# Patient Record
Sex: MALE | Race: WHITE
[De-identification: names, ages, dates, MRNs, and addresses within clinical notes are randomized per-mention and may not be internally consistent; named-entity substitution may affect disease eponyms.]

---

## 2020-12-29 ENCOUNTER — HOSPITAL ENCOUNTER (OUTPATIENT)
Dept: HOSPITAL 74 - FASU | Age: 50
Discharge: HOME | End: 2020-12-29
Attending: ORTHOPAEDIC SURGERY
Payer: COMMERCIAL

## 2020-12-29 VITALS — HEART RATE: 82 BPM | DIASTOLIC BLOOD PRESSURE: 71 MMHG | SYSTOLIC BLOOD PRESSURE: 128 MMHG

## 2020-12-29 VITALS — TEMPERATURE: 97.5 F

## 2020-12-29 VITALS — BODY MASS INDEX: 36.6 KG/M2

## 2020-12-29 DIAGNOSIS — M19.011: ICD-10-CM

## 2020-12-29 DIAGNOSIS — M75.121: Primary | ICD-10-CM

## 2020-12-29 DIAGNOSIS — M75.01: ICD-10-CM

## 2020-12-29 DIAGNOSIS — M75.41: ICD-10-CM

## 2020-12-29 DIAGNOSIS — M24.111: ICD-10-CM

## 2020-12-29 PROCEDURE — 0RBJ4ZZ EXCISION OF RIGHT SHOULDER JOINT, PERCUTANEOUS ENDOSCOPIC APPROACH: ICD-10-PCS | Performed by: ORTHOPAEDIC SURGERY

## 2020-12-29 PROCEDURE — 0PB94ZZ EXCISION OF RIGHT CLAVICLE, PERCUTANEOUS ENDOSCOPIC APPROACH: ICD-10-PCS | Performed by: ORTHOPAEDIC SURGERY

## 2020-12-29 PROCEDURE — 0RNJ4ZZ RELEASE RIGHT SHOULDER JOINT, PERCUTANEOUS ENDOSCOPIC APPROACH: ICD-10-PCS | Performed by: ORTHOPAEDIC SURGERY

## 2020-12-29 PROCEDURE — U0003 INFECTIOUS AGENT DETECTION BY NUCLEIC ACID (DNA OR RNA); SEVERE ACUTE RESPIRATORY SYNDROME CORONAVIRUS 2 (SARS-COV-2) (CORONAVIRUS DISEASE [COVID-19]), AMPLIFIED PROBE TECHNIQUE, MAKING USE OF HIGH THROUGHPUT TECHNOLOGIES AS DESCRIBED BY CMS-2020-01-R: HCPCS

## 2020-12-29 PROCEDURE — C9803 HOPD COVID-19 SPEC COLLECT: HCPCS

## 2020-12-29 PROCEDURE — 0LQ14ZZ REPAIR RIGHT SHOULDER TENDON, PERCUTANEOUS ENDOSCOPIC APPROACH: ICD-10-PCS | Performed by: ORTHOPAEDIC SURGERY

## 2020-12-30 NOTE — PATH
Surgical Pathology Report



Patient Name:  NIYA GONG

Accession #:  [id number]

Med. Rec. #:  [id number]                                                        

   /Age/Gender:  1970 (Age: 50) / M

Account:  [id number]                                                          

             [facility] 

Taken:  2020

Received:  2020

Reported:  2020

Physicians:  Pardeep Villa M.D.

  



Specimen(s) Received

 RIGHT SHOULDER SHAVINGS 





Clinical History

Right rotator cuff tear







Final Diagnosis

RIGHT SHOULDER SHAVINGS:

PORTIONS OF FIBROCARTILAGINOUS TISSUE WITH FOCAL DEGENERATIVE CHANGE AND

CHONDROCALCINOSIS.

FRAGMENTS OF BONE AND SKELETAL MUSCLE WITH NO SIGNIFICANT PATHOLOGIC CHANGE.





***Electronically Signed***

Milton Wayne M.D.





Gross Description

Received in formalin labeled "right shoulder shavings," are multiple fragments

of yellow and white soft tissue measuring 3.0 x 3.0 x 0.3 cm aggregate. The

formalin is filtered and representative portions are submitted in one cassette.

GINNA/2020



felisha/2020

## 2020-12-30 NOTE — OP
DATE OF OPERATION:  12/30/2020

 

LOCATION:  [facility]

 

SURGEON:  Pardeep Lehman MD 

 

ASSISTANT:  IRIS Rueda 

 

PREOPERATIVE DIAGNOSES:  

1.  Right shoulder rotator cuff tear. 

2.  Right shoulder adhesive capsulitis. 

3.  Right shoulder impingement.  

4.  Right shoulder acromioclavicular degenerative joint disease. 

5.  Right shoulder superior labral tear, anterior and posterior, with synovitis. 

 

POSTOPERATIVE DIAGNOSES:  

1.  Right shoulder rotator cuff tear. 

2.  Right shoulder adhesive capsulitis. 

3.  Right shoulder impingement.  

4.  Right shoulder acromioclavicular degenerative joint disease. 

5.  Right shoulder superior labral tear, anterior and posterior, with synovitis. 

 

PROCEDURE PERFORMED:  

1.  Right shoulder arthroscopy with arthroscopic rotator cuff repair (CPT code

22251). 

2.  Right shoulder arthroscopy with lysis and resection of adhesions (CPT code

00643). 

3.  Right shoulder arthroscopy with subacromial decompression (CPT code 19229). 

4.  Right shoulder arthroscopy with resection of the distal clavicle and

acromioclavicular joint (CPT code 55619). 

5.  Right shoulder arthroscopy with debridement (CPT code 42444). 

 

FINDINGS:  

1.  Glenohumeral synovitis with adhesions anteriorly to the subscapularis. 

2.  Superior labral tear, anterior-posterior, type 1. 

3.  Partial biceps tear, 10%. 

4.  Full-thickness supraspinatus rotator cuff repair, 6 cm in length, full thickness,

central portion. 

5.  Type 2 acromion with anterolateral spurring. 

6.  Inferior spurs of the clavicle, acromioclavicular joint disease. 

7.  Thick scar to subacromial space.  

8.  Posterior labral fraying. 

 

REPAIR TYPE:  Three mattress sutures were placed in the supraspinatus.  The anterior

two mattress were secured to a Satinder rotator cuff anchor and the posterior was put

in a separate anchor.  The superior flap of the rotator cuff was then secured by

bringing the tie connecting the flap to the posterior anchor.  

 

DESCRIPTION PROCEDURE:  Informed consent was obtained.  The patient was taken to the

operating room, where the upper extremity was prepped and draped in a sterile

fashion.  The shoulder was manipulated for a full range of motion. 

 

A posterior incision portal was made and directed to the glenohumeral joint.  Under

direct visualization, an anterior incision and portal was made.  Extensive synovitis,

as well as chondral injuries throughout the glenohumeral joint were debrided and

removed.  Any identified labral injuries, including the superior labral tear,

anterior and posterior, and anterior labrum torn portions, were removed as well. 

 

The rotator cuff was visualized and noted to have a full-thickness tear.  The edges

were debrided.  The posterior incision portal was redirected to the subacromial space

where a lateral incision portal was made.  Excessive and thickened scar tissue noted

throughout the subacromial space, including bursal and scar tissue, were removed. 

The type 2 acromion was converted into a flattened type 1 using a bur for subacromial

decompression.  The distal inferior spur at the distal clavicle was also debrided

with the use of an accessory portal in the acromioclavicular joint.  The edges of the

rotator cuff were identified.  Sutures were placed into the rotator cuff and secured

using anchors through the greater tuberosity.  Prior to securing, a bleeding bed was

made using a small bur, creating a bleeding surface of the rotator cuff insertion. 

 

The shoulder was then drained, a single suture was placed in all portals, a sterile

dressing was placed and the patient was transferred to the recovery room without

complication. 

The PA listed above was present and assisted at surgery.  Their presence was

absolutely medically necessary for the completion of the procedure.  They helped hold

the arthroscopy, pass instruments (and implants when indicated) and the procedure

could not have been completed without their assistance.

 

 

PARDEEP LEHMAN M.D.

 

[id number]

DD: 12/29/2020 10:28

DT: 12/30/2020 08:23

Job #:  [id number]

## 2022-02-01 ENCOUNTER — HOSPITAL ENCOUNTER (EMERGENCY)
Dept: HOSPITAL 74 - JERFT | Age: 52
Discharge: HOME | End: 2022-02-01
Payer: COMMERCIAL

## 2022-02-01 VITALS — TEMPERATURE: 99.2 F | HEART RATE: 93 BPM | DIASTOLIC BLOOD PRESSURE: 77 MMHG | SYSTOLIC BLOOD PRESSURE: 131 MMHG

## 2022-02-01 VITALS — BODY MASS INDEX: 37.3 KG/M2
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
## 2022-02-01 DIAGNOSIS — S83.91XA: Primary | ICD-10-CM

## 2022-02-23 ENCOUNTER — HOSPITAL ENCOUNTER (EMERGENCY)
Dept: HOSPITAL 74 - JERFT | Age: 52
Discharge: HOME | End: 2022-02-23
Payer: COMMERCIAL

## 2022-02-23 VITALS — SYSTOLIC BLOOD PRESSURE: 124 MMHG | TEMPERATURE: 98.6 F | HEART RATE: 86 BPM | DIASTOLIC BLOOD PRESSURE: 80 MMHG

## 2022-02-23 VITALS — BODY MASS INDEX: 37.3 KG/M2

## 2022-02-23 DIAGNOSIS — M54.31: Primary | ICD-10-CM

## 2022-06-20 ENCOUNTER — HOSPITAL ENCOUNTER (EMERGENCY)
Dept: HOSPITAL 74 - JER | Age: 52
Discharge: HOME | End: 2022-06-20
Payer: COMMERCIAL

## 2022-06-20 VITALS — SYSTOLIC BLOOD PRESSURE: 118 MMHG | HEART RATE: 93 BPM | TEMPERATURE: 98.6 F | DIASTOLIC BLOOD PRESSURE: 78 MMHG

## 2022-06-20 VITALS — BODY MASS INDEX: 38 KG/M2

## 2022-06-20 DIAGNOSIS — M79.661: Primary | ICD-10-CM

## 2023-04-02 ENCOUNTER — NON-APPOINTMENT (OUTPATIENT)
Age: 53
End: 2023-04-02

## 2023-04-24 ENCOUNTER — NON-APPOINTMENT (OUTPATIENT)
Age: 53
End: 2023-04-24

## 2023-05-08 ENCOUNTER — NON-APPOINTMENT (OUTPATIENT)
Age: 53
End: 2023-05-08

## 2024-09-06 ENCOUNTER — HOSPITAL ENCOUNTER (EMERGENCY)
Dept: HOSPITAL 74 - JER | Age: 54
Discharge: HOME | End: 2024-09-06
Payer: COMMERCIAL

## 2024-09-06 VITALS
HEART RATE: 98 BPM | SYSTOLIC BLOOD PRESSURE: 118 MMHG | DIASTOLIC BLOOD PRESSURE: 86 MMHG | TEMPERATURE: 98.9 F | RESPIRATION RATE: 18 BRPM

## 2024-09-06 VITALS — BODY MASS INDEX: 31.8 KG/M2

## 2024-09-06 DIAGNOSIS — R11.0: ICD-10-CM

## 2024-09-06 DIAGNOSIS — M54.50: Primary | ICD-10-CM

## 2024-09-06 LAB
APPEARANCE UR: CLEAR
BILIRUB UR STRIP.AUTO-MCNC: NEGATIVE MG/DL
COLOR UR: YELLOW
KETONES UR QL STRIP: NEGATIVE
LEUKOCYTE ESTERASE UR QL STRIP.AUTO: NEGATIVE
NITRITE UR QL STRIP: NEGATIVE
PH UR: 5.5 [PH] (ref 5–8)
PROT UR QL STRIP: NEGATIVE
PROT UR QL STRIP: NEGATIVE
SP GR UR: 1.02 (ref 1.01–1.03)
UROBILINOGEN UR STRIP-MCNC: 1 MG/DL (ref 0.2–1)

## 2024-09-06 PROCEDURE — 3E0133Z INTRODUCTION OF ANTI-INFLAMMATORY INTO SUBCUTANEOUS TISSUE, PERCUTANEOUS APPROACH: ICD-10-PCS | Performed by: EMERGENCY MEDICINE

## 2024-09-06 RX ADMIN — KETOROLAC TROMETHAMINE ONE MG: 30 INJECTION INTRAMUSCULAR; INTRAVENOUS at 20:08

## 2024-09-06 RX ADMIN — LIDOCAINE ONE PATCH: 50 PATCH TOPICAL at 20:09
